# Patient Record
Sex: FEMALE | Race: BLACK OR AFRICAN AMERICAN | NOT HISPANIC OR LATINO | URBAN - METROPOLITAN AREA
[De-identification: names, ages, dates, MRNs, and addresses within clinical notes are randomized per-mention and may not be internally consistent; named-entity substitution may affect disease eponyms.]

---

## 2019-06-18 ENCOUNTER — EMERGENCY (EMERGENCY)
Facility: HOSPITAL | Age: 29
LOS: 1 days | Discharge: HOME | End: 2019-06-18
Attending: EMERGENCY MEDICINE | Admitting: EMERGENCY MEDICINE
Payer: COMMERCIAL

## 2019-06-18 VITALS
RESPIRATION RATE: 16 BRPM | OXYGEN SATURATION: 100 % | WEIGHT: 169.98 LBS | HEART RATE: 64 BPM | TEMPERATURE: 98 F | SYSTOLIC BLOOD PRESSURE: 124 MMHG | HEIGHT: 69 IN | DIASTOLIC BLOOD PRESSURE: 80 MMHG

## 2019-06-18 VITALS
HEART RATE: 65 BPM | OXYGEN SATURATION: 100 % | DIASTOLIC BLOOD PRESSURE: 79 MMHG | SYSTOLIC BLOOD PRESSURE: 121 MMHG | RESPIRATION RATE: 14 BRPM | TEMPERATURE: 98 F

## 2019-06-18 PROCEDURE — 73562 X-RAY EXAM OF KNEE 3: CPT

## 2019-06-18 PROCEDURE — 73030 X-RAY EXAM OF SHOULDER: CPT | Mod: 26,RT

## 2019-06-18 PROCEDURE — 73610 X-RAY EXAM OF ANKLE: CPT

## 2019-06-18 PROCEDURE — 73030 X-RAY EXAM OF SHOULDER: CPT

## 2019-06-18 PROCEDURE — 99284 EMERGENCY DEPT VISIT MOD MDM: CPT

## 2019-06-18 PROCEDURE — 73562 X-RAY EXAM OF KNEE 3: CPT | Mod: 26,RT

## 2019-06-18 PROCEDURE — 73610 X-RAY EXAM OF ANKLE: CPT | Mod: 26,RT

## 2019-06-18 PROCEDURE — 99284 EMERGENCY DEPT VISIT MOD MDM: CPT | Mod: 25

## 2019-06-18 RX ORDER — IBUPROFEN 200 MG
600 TABLET ORAL ONCE
Refills: 0 | Status: COMPLETED | OUTPATIENT
Start: 2019-06-18 | End: 2019-06-18

## 2019-06-18 RX ADMIN — Medication 600 MILLIGRAM(S): at 15:19

## 2019-06-18 RX ADMIN — Medication 600 MILLIGRAM(S): at 15:20

## 2019-06-18 NOTE — ED PROVIDER NOTE - OBJECTIVE STATEMENT
27 y/o F presents with c/o R shoulder and ankle pain sp fall today. Pt states that she slipped and fell down 6-8 steps at work around 1:45pm today. Pt c/o pain to her R shoulder and R ankle. States that she also has occasional L mid back pain, only with movement but none now. Denies head trauma, LOC, headache, dizziness, n/v, open wounds, numbness, tingling, use of blood thinners, hematuria, neck/hip pain, CP, SOB, abdominal pain or other symptoms/injuries. Pt is R hand dominant.

## 2019-06-18 NOTE — ED PROVIDER NOTE - UPPER EXTREMITY EXAM, RIGHT
+ttp R shoulder with FROM, no swelling or erythema noted, skin intact, fingers warm & mobile, cap refill<2sec, distal pulses and sensation intact, NVI/TENDERNESS

## 2019-06-18 NOTE — ED PROVIDER NOTE - PROGRESS NOTE DETAILS
Jose Daniel Reed for Dr Jack Latham - Attending Contribution to Care :  29 yo F presenting to the ED s/op fall at work today. Presents with right shoulder and ankle pain, and back pain. No head injury or LOC. No other trauma. Denies neck pain.   Exam: minor tenderness to the right shoulder and ankle, mild tenderness to the lower back. no deformity, FROM of all extremities  Plan- xray, pain meds, and ortho follow up ., Pt examined by ED attending, Dr. Latham who agreed with disposition and plan. Reevaluated patient at bedside.  Patient feeling much improved.  Discussed the results of all diagnostic testing in ED and copies of all reports given. Will place R ankle in ace wrap, RICE, WBAT, nsaids for pain, f/u ortho. An opportunity to ask questions was given.  Discussed the importance of prompt, close medical follow-up.  Patient will return with any changes, concerns or persistent / worsening symptoms.  Understanding of all instructions verbalized.

## 2019-06-18 NOTE — ED PROVIDER NOTE - LOWER EXTREMITY EXAM, RIGHT
+ttp R anterior knee with FROM, no swelling or erythema noted, skin intact; +ttp medial malleolus with FROM, no swelling or erythema noted, achilles NT and intact, no 5th MT tenderness, toes warm &  mobile, cap refill<2sec, pulses and sensation intact, NVI/TENDERNESS

## 2019-06-18 NOTE — ED PROVIDER NOTE - CARE PLAN
Principal Discharge DX:	Fall  Secondary Diagnosis:	Shoulder pain, right  Secondary Diagnosis:	Ankle pain, right

## 2019-06-18 NOTE — ED PROVIDER NOTE - CLINICAL SUMMARY MEDICAL DECISION MAKING FREE TEXT BOX
27 yo F with co R ankle and shoulder pain sp slip and fall down the stairs at work today, no head trauma/loc/use of blood thinners, NVI, +ttp R ankle, R knee and R shoulder on exam, no spinal tenderness noted, ambulatory in ED without assistance and in NAD, will give motrin, xrays, f/u ortho.

## 2019-06-18 NOTE — ED PROVIDER NOTE - MUSCULOSKELETAL NECK EXAM
no deformity, pain or tenderness. no restriction of movement/No C/T/L spine tenderness or ecchymosis on exam, FROM, no midline tenderness, no CVAT B, NVI, neg SLR B

## 2019-07-01 ENCOUNTER — APPOINTMENT (OUTPATIENT)
Dept: INTERNAL MEDICINE | Facility: CLINIC | Age: 29
End: 2019-07-01
Payer: MEDICAID

## 2019-07-01 VITALS
WEIGHT: 183 LBS | HEART RATE: 66 BPM | DIASTOLIC BLOOD PRESSURE: 70 MMHG | SYSTOLIC BLOOD PRESSURE: 110 MMHG | HEIGHT: 68 IN | TEMPERATURE: 98.7 F | BODY MASS INDEX: 27.74 KG/M2 | OXYGEN SATURATION: 98 %

## 2019-07-01 DIAGNOSIS — Z82.5 FAMILY HISTORY OF ASTHMA AND OTHER CHRONIC LOWER RESPIRATORY DISEASES: ICD-10-CM

## 2019-07-01 DIAGNOSIS — Z00.00 ENCOUNTER FOR GENERAL ADULT MEDICAL EXAMINATION W/OUT ABNORMAL FINDINGS: ICD-10-CM

## 2019-07-01 DIAGNOSIS — N62 HYPERTROPHY OF BREAST: ICD-10-CM

## 2019-07-01 DIAGNOSIS — M54.5 LOW BACK PAIN: ICD-10-CM

## 2019-07-01 DIAGNOSIS — G89.29 LOW BACK PAIN: ICD-10-CM

## 2019-07-01 DIAGNOSIS — Z83.2 FAMILY HISTORY OF DISEASES OF THE BLOOD AND BLOOD-FORMING ORGANS AND CERTAIN DISORDERS INVOLVING THE IMMUNE MECHANISM: ICD-10-CM

## 2019-07-01 PROCEDURE — 99203 OFFICE O/P NEW LOW 30 MIN: CPT | Mod: 25

## 2019-07-01 PROCEDURE — 36415 COLL VENOUS BLD VENIPUNCTURE: CPT

## 2019-07-01 NOTE — HEALTH RISK ASSESSMENT
[Excellent] : ~his/her~  mood as  excellent [] : No [Yes] : Yes [Monthly or less (1 pt)] : Monthly or less (1 point) [1 or 2 (0 pts)] : 1 or 2 (0 points) [No] : In the past 12 months have you used drugs other than those required for medical reasons? No [No falls in past year] : Patient reported no falls in the past year [0] : 2) Feeling down, depressed, or hopeless: Not at all (0) [de-identified] : Gynecologist (?name) [Audit-CScore] : 1 [de-identified] : keeps active, does not do regular exercise [de-identified] : generally healthy [[id number]] : 0 [Patient reported PAP Smear was normal] : Patient reported PAP Smear was normal [HIV Test offered] : HIV Test offered [Hepatitis C test offered] : Hepatitis C test offered [None] : None [With Family] : lives with family [Employed] : employed [Single] : single [Sexually Active] : not sexually active [Feels Safe at Home] : Feels safe at home [Fully functional (bathing, dressing, toileting, transferring, walking, feeding)] : Fully functional (bathing, dressing, toileting, transferring, walking, feeding) [Fully functional (using the telephone, shopping, preparing meals, housekeeping, doing laundry, using] : Fully functional and needs no help or supervision to perform IADLs (using the telephone, shopping, preparing meals, housekeeping, doing laundry, using transportation, managing medications and managing finances) [Reports changes in hearing] : Reports no changes in hearing [Reports changes in vision] : Reports no changes in vision [Reports changes in dental health] : Reports no changes in dental health [Smoke Detector] : smoke detector [Seat Belt] :  uses seat belt [Sunscreen] : uses sunscreen [PapSmearDate] : 1/2018 [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

## 2019-07-01 NOTE — PHYSICAL EXAM

## 2019-07-01 NOTE — HISTORY OF PRESENT ILLNESS
[FreeTextEntry1] : "Here for a physical...and need Tb screening" [de-identified] : VIVI BUCKLEY is a 28 year old female who presents for an initial comprehensive medical evaluation. She offers no acute complaints today, just states that she is due for a yearly exam and her yearly Tb screening for her job. She keeps active, has a daughter who keeps her active. Tries to maintain a balanced diet rich in fruits/veggies, but does acknowledge that she does eat fast food at times without much restriction in type of food. She denies any chest pains, shortness of breath, abdominal pains or urinary discomfort. She has occasional headaches which she feels is only when her "hair is too tight...gets better when I loosen my hair". Headaches are mild, achy in nature, does feel the need to take any OTC meds for them. She later mentions that due to her large breasts, when she is standing for a long time, she feels a pull in her lower back. She wears support bra, which used to help, but not any longer. After she had her daughter, her breasts got larger and has had more lower back pain since then. Back pain is localized to mid lower back, mild-moderate, achy, does not take pain meds as sitting and lying back in a position where her breasts are not pulling as much on her back help reduce pain.

## 2019-07-01 NOTE — PLAN
[FreeTextEntry1] : Quantiferon gold pending--will call patient once results available for her job (patient does not have any form to be filled out, may need copy of lab report).\par \par Last PAP: 1/2018--will f/u yearly with gynecologist; has IUD (replaced in 2018)\par Vaccines:\par -Flu: not in season\par -Td/TdaP: patient defers

## 2019-07-05 ENCOUNTER — RESULT REVIEW (OUTPATIENT)
Age: 29
End: 2019-07-05

## 2019-07-05 DIAGNOSIS — E55.9 VITAMIN D DEFICIENCY, UNSPECIFIED: ICD-10-CM

## 2019-07-05 LAB
25(OH)D3 SERPL-MCNC: 10.9 NG/ML
ALBUMIN SERPL ELPH-MCNC: 4.8 G/DL
ALP BLD-CCNC: 73 U/L
ALT SERPL-CCNC: 16 U/L
ANION GAP SERPL CALC-SCNC: 11 MMOL/L
APPEARANCE: CLEAR
AST SERPL-CCNC: 12 U/L
BASOPHILS # BLD AUTO: 0.04 K/UL
BASOPHILS NFR BLD AUTO: 0.3 %
BILIRUB SERPL-MCNC: 0.8 MG/DL
BILIRUBIN URINE: NEGATIVE
BLOOD URINE: NEGATIVE
BUN SERPL-MCNC: 9 MG/DL
C TRACH RRNA SPEC QL NAA+PROBE: NOT DETECTED
CALCIUM SERPL-MCNC: 9.7 MG/DL
CHLORIDE SERPL-SCNC: 104 MMOL/L
CHOLEST SERPL-MCNC: 203 MG/DL
CHOLEST/HDLC SERPL: 4.3 RATIO
CO2 SERPL-SCNC: 24 MMOL/L
COLOR: YELLOW
CREAT SERPL-MCNC: 0.64 MG/DL
EOSINOPHIL # BLD AUTO: 0.48 K/UL
EOSINOPHIL NFR BLD AUTO: 4.2 %
ESTIMATED AVERAGE GLUCOSE: 111 MG/DL
GLUCOSE QUALITATIVE U: NEGATIVE
GLUCOSE SERPL-MCNC: 92 MG/DL
HBA1C MFR BLD HPLC: 5.5 %
HCT VFR BLD CALC: 42.6 %
HDLC SERPL-MCNC: 47 MG/DL
HGB BLD-MCNC: 12.7 G/DL
HIV1+2 AB SPEC QL IA.RAPID: NONREACTIVE
HSV 1+2 IGG SER IA-IMP: NEGATIVE
HSV 1+2 IGG SER IA-IMP: POSITIVE
HSV1 IGG SER QL: 19.8 INDEX
HSV1 IGM SER QL: NORMAL TITER
HSV2 AB FLD-ACNC: NORMAL TITER
HSV2 IGG SER QL: 0.22 INDEX
IMM GRANULOCYTES NFR BLD AUTO: 0.3 %
KETONES URINE: NEGATIVE
LDLC SERPL CALC-MCNC: 133 MG/DL
LEUKOCYTE ESTERASE URINE: NEGATIVE
LYMPHOCYTES # BLD AUTO: 2.49 K/UL
LYMPHOCYTES NFR BLD AUTO: 21.6 %
M TB IFN-G BLD-IMP: NEGATIVE
MAN DIFF?: NORMAL
MCHC RBC-ENTMCNC: 26.4 PG
MCHC RBC-ENTMCNC: 29.8 GM/DL
MCV RBC AUTO: 88.6 FL
MEV IGG FLD QL IA: 268 AU/ML
MEV IGG+IGM SER-IMP: POSITIVE
MONOCYTES # BLD AUTO: 0.62 K/UL
MONOCYTES NFR BLD AUTO: 5.4 %
MUV AB SER-ACNC: POSITIVE
MUV IGG SER QL IA: 28.3 AU/ML
N GONORRHOEA RRNA SPEC QL NAA+PROBE: NOT DETECTED
NEUTROPHILS # BLD AUTO: 7.84 K/UL
NEUTROPHILS NFR BLD AUTO: 68.2 %
NITRITE URINE: NEGATIVE
PH URINE: 6.5
PLATELET # BLD AUTO: 289 K/UL
POTASSIUM SERPL-SCNC: 4.2 MMOL/L
PROT SERPL-MCNC: 7.6 G/DL
PROTEIN URINE: NORMAL
QUANTIFERON TB PLUS MITOGEN MINUS NIL: 2.18 IU/ML
QUANTIFERON TB PLUS NIL: 0.03 IU/ML
QUANTIFERON TB PLUS TB1 MINUS NIL: -0.01 IU/ML
QUANTIFERON TB PLUS TB2 MINUS NIL: -0.01 IU/ML
RBC # BLD: 4.81 M/UL
RBC # FLD: 13.3 %
RUBV IGG FLD-ACNC: 3.2 INDEX
RUBV IGG SER-IMP: POSITIVE
SODIUM SERPL-SCNC: 139 MMOL/L
SOURCE AMPLIFICATION: NORMAL
SPECIFIC GRAVITY URINE: 1.02
T PALLIDUM AB SER QL IA: NEGATIVE
TRIGL SERPL-MCNC: 116 MG/DL
TSH SERPL-ACNC: 0.31 UIU/ML
UROBILINOGEN URINE: NORMAL
WBC # FLD AUTO: 11.51 K/UL

## 2019-07-05 RX ORDER — ERGOCALCIFEROL 1.25 MG/1
1.25 MG CAPSULE ORAL
Qty: 13 | Refills: 1 | Status: ACTIVE | COMMUNITY
Start: 2019-07-05 | End: 1900-01-01

## 2019-09-03 ENCOUNTER — APPOINTMENT (OUTPATIENT)
Dept: INTERNAL MEDICINE | Facility: CLINIC | Age: 29
End: 2019-09-03

## 2023-08-28 ENCOUNTER — NON-APPOINTMENT (OUTPATIENT)
Age: 33
End: 2023-08-28

## 2023-09-06 NOTE — ED PROVIDER NOTE - PELVIS
[(# ___since the last visit)] : [unfilled] visits to the emergency room since the last visit [(# ___ since the last visit)] : hospitalized [unfilled] times since the last visit [( # ___ since the last visit)] : intubated [unfilled] times since the last visit [0 x/month] : 0 x/month [None] : None [< or = 2 days/wk] : < than or = 2 days/week [> or = 20] : > than or = 20 [FreeTextEntry1] : none currently [FreeTextEntry6] : none since last visit [FreeTextEntry7] : 25 stable

## 2023-10-12 ENCOUNTER — NON-APPOINTMENT (OUTPATIENT)
Age: 33
End: 2023-10-12

## 2024-02-07 ENCOUNTER — APPOINTMENT (OUTPATIENT)
Dept: ANTEPARTUM | Facility: CLINIC | Age: 34
End: 2024-02-07